# Patient Record
Sex: MALE | Race: WHITE
[De-identification: names, ages, dates, MRNs, and addresses within clinical notes are randomized per-mention and may not be internally consistent; named-entity substitution may affect disease eponyms.]

---

## 2017-01-20 ENCOUNTER — HOSPITAL ENCOUNTER (EMERGENCY)
Dept: HOSPITAL 80 - FED | Age: 82
Discharge: HOME | End: 2017-01-20
Payer: COMMERCIAL

## 2017-01-20 VITALS
RESPIRATION RATE: 16 BRPM | TEMPERATURE: 97.9 F | DIASTOLIC BLOOD PRESSURE: 74 MMHG | HEART RATE: 64 BPM | OXYGEN SATURATION: 96 % | SYSTOLIC BLOOD PRESSURE: 144 MMHG

## 2017-01-20 DIAGNOSIS — R04.0: Primary | ICD-10-CM

## 2017-01-20 DIAGNOSIS — Z87.891: ICD-10-CM

## 2017-01-20 DIAGNOSIS — I10: ICD-10-CM

## 2017-01-20 NOTE — EDPHY
H & P


Stated Complaint: Left nostril bleeding x 45 min


Time Seen by Provider: 01/20/17 01:12


HPI/ROS: 


HPI


The patient presents with epistaxis from left nose which has been present for 

the last 1 hour, now resolved.  The patient recently returned from [place].  He noticed the bleeding tonight while at rest.  He tried packing his 

nose, however passed 2 large clots and came into the emergency room.  Now with 

pressure the bleeding has stopped.  He has been evaluated by ENT in the past 

and had cauterization performed.  He is on clopidogrel..





REVIEW OF SYSTEMS


Constitutional:  No fever, no chills.


Eyes:  No discharge.


ENT:  No sore throat.


Skin:  No rashes.


Neurological:  No headache.











PHYSICAL


General Appearance: Alert, no distress


Eyes: Pupils equal and round no pallor or injection


ENT, Mouth: Nares with no active bleeding, Mucous membranes moist


Respiratory:  Breathing comfortably


Neurological:  A&O, moves all extremities


Skin:  Warm and dry, no rashes


Musculoskeletal: Neck is supple non tender 


Extremities:  symmetrical, full range of motion 


Psychiatric:  Patient is oriented X 3, there is no agitation 





Source: Patient





- Personal History


Current Tetanus/Diphtheria Vaccine: Yes


Current Tetanus Diphtheria and Acellular Pertussis (TDAP): Yes


Tetanus Vaccine Date: 2013





- Medical/Surgical History


Hx Asthma: No


Hx Chronic Respiratory Disease: No


Hx Diabetes: No


Hx Cardiac Disease: Yes


Hx Renal Disease: No


Hx Cirrhosis: No


Hx Alcoholism: No


Hx HIV/AIDS: No


Hx Splenectomy or Spleen Trauma: No


Other PMH: BYPASS X 5, HIGH CHOLESTEROL, HTN, USE CPAP AT NIGHT, Total right 

knee, R shoulder muscle surgery





- Social History


Smoking Status: Former smoker


Constitutional: 


 Initial Vital Signs











Heart Rate  86   01/20/17 00:53


 


Respiratory Rate  18   01/20/17 00:53


 


Blood Pressure  155/110 H  01/20/17 00:53


 


O2 Sat (%)  93   01/20/17 00:53








 











O2 Delivery Mode               Room Air














Allergies/Adverse Reactions: 


 





No Known Allergies Allergy (Verified 01/20/17 00:48)


 








Home Medications: 














 Medication  Instructions  Recorded


 


FOSINOPRIL SODIUM  07/30/14


 


Finasteride [Proscar 5 MG (*)] 5 mg PO DAILY 07/30/14


 


Hydrochlorothiazide [HCTZ (*)] 12.5 mg PO DAILY 07/30/14


 


Metoprolol Succinate Xr [Toprol Xl 100 mg PO DAILY@1630 07/30/14





100 mg (*)]  


 


Rosuvastatin Calcium [Crestor] 10 mg PO DAILY@1630 07/30/14


 


Travoprost Z 0.004% [Travatan Z 1 drops EACHEYE HS 07/30/14





0.004% (*)]  


 


Vit C/Priti AC/Lut/Copper/Znox 1 each PO DAILY@1630 07/30/14





[Preservision Lutein Softgel]  


 


Clopidogrel  01/20/17














Medical Decision Making


Differential Diagnosis: 


This is an 88-year-old man on the clopidogrel who presents from home with 

epistaxis, now resolved.  On exam, he is hypertensive, no obvious source of 

bleeding is identified.  Differential diagnosis includes bleeding from 

Kiesselbach's plexus, posterior epistaxis, coagulopathy related to clopidogrel.





In the emergency room, the patient was given Afrin and nasal clamps for home 

use.  He was given information for ENT for follow-up tomorrow as needed.  He 

had no recurrence of his bleeding.





- Data Points


Medications Given: 


 








Discontinued Medications





Oxymetazoline HCl (Afrin Nasal Spray)  2 sprays EACHNARE EDNOW ONE


   Stop: 01/20/17 01:49


   Last Admin: 01/20/17 01:51 Dose:  2 spray








Departure





- Departure


Disposition: Home, Routine, Self-Care


Clinical Impression: 


 Acute anterior epistaxis


Condition: Good


Instructions:  Nosebleed (ED)


Additional Instructions: 


Please call the ENT as listed below for follow-up later today.  He should 

return to the emergency room if your worse in any way.


Referrals: 


Heriberto Sow MD [Primary Care Provider] - As per Instructions


Ashley Castaneda PA [Physician Assistant] - As per Instructions

## 2018-02-20 ENCOUNTER — HOSPITAL ENCOUNTER (EMERGENCY)
Dept: HOSPITAL 80 - FED | Age: 83
Discharge: HOME | End: 2018-02-20
Payer: COMMERCIAL

## 2018-02-20 VITALS — RESPIRATION RATE: 18 BRPM | TEMPERATURE: 97.7 F

## 2018-02-20 VITALS — OXYGEN SATURATION: 95 % | HEART RATE: 89 BPM | DIASTOLIC BLOOD PRESSURE: 87 MMHG | SYSTOLIC BLOOD PRESSURE: 128 MMHG

## 2018-02-20 DIAGNOSIS — R04.0: Primary | ICD-10-CM

## 2018-02-20 DIAGNOSIS — Z87.891: ICD-10-CM

## 2018-02-20 DIAGNOSIS — I10: ICD-10-CM

## 2018-02-20 PROCEDURE — 3E09XTZ INTRODUCTION OF DESTRUCTIVE AGENT INTO NOSE, EXTERNAL APPROACH: ICD-10-PCS | Performed by: EMERGENCY MEDICINE

## 2018-02-20 NOTE — EDPHY
H & P


Stated Complaint: nosebleed x45 min


Time Seen by Provider: 02/20/18 00:14


HPI/ROS: 





Chief Complaint:  Nosebleed





HPI:  89-year-old male woke 45 min ago with bleeding from his left nostril.  He 

has a history of nosebleeds in the past.  Had a mild when a couple days ago 

that went away with direct pressure for few minutes.  He is on Plavix but no 

other blood thinning medications.  Has required cautery 2 years ago.  No recent 

illness.  No fevers or chills.  They do not have a humidifier in the home 

because they have had mold issues in the past.  No headache.  No foreign bodies.





ROS:  10 point Review of Systems is negative except as noted in the HPI.





Family History: non-contributory





Physical Exam:


Gen: Awake, Alert, No Distress


HEENT:  


     Nose:  Moderate bleeding from his anterior left nostril


     Eyes: PERRLA, EOMI


     Mouth: Moist mucosa 


Neck: Supple, no JVD


Ext: no edema, non-tender


Skin: no rash


Neuro: CN II-XII intact, Sensation grossly intact, Strength 5/5 in bilateral 

upper and lower extremities








- Personal History


Current Tetanus/Diphtheria Vaccine: Yes


Tetanus Vaccine Date: 2013





- Medical/Surgical History


Hx Asthma: No


Hx Chronic Respiratory Disease: No


Hx Diabetes: No


Hx Cardiac Disease: Yes


Hx Renal Disease: No


Hx Cirrhosis: No


Hx Alcoholism: No


Hx HIV/AIDS: No


Hx Splenectomy or Spleen Trauma: No


Other PMH: BYPASS X 5, HIGH CHOLESTEROL, HTN, USE CPAP AT NIGHT, Total right 

knee, R shoulder muscle surgery





- Social History


Smoking Status: Former smoker


Constitutional: 


 Initial Vital Signs











Temperature (C)  36.5 C   02/20/18 00:03


 


Heart Rate  118 H  02/20/18 00:03


 


Respiratory Rate  18   02/20/18 00:03


 


Blood Pressure  159/105 H  02/20/18 00:03


 


O2 Sat (%)  96   02/20/18 00:03








 











O2 Delivery Mode               Room Air














Allergies/Adverse Reactions: 


 





No Known Allergies Allergy (Verified 01/20/17 00:48)


 








Home Medications: 














 Medication  Instructions  Recorded


 


FOSINOPRIL SODIUM  07/30/14


 


Finasteride [Proscar 5 MG (*)] 5 mg PO DAILY 07/30/14


 


Hydrochlorothiazide [HCTZ (*)] 12.5 mg PO DAILY 07/30/14


 


Metoprolol Succinate Xr [Toprol Xl 100 mg PO DAILY@1630 07/30/14





100 mg (*)]  


 


Rosuvastatin Calcium [Crestor] 10 mg PO DAILY@1630 07/30/14


 


Travoprost Z 0.004% [Travatan Z 1 drops EACHEYE HS 07/30/14





0.004% (*)]  


 


Vit C/Vit E AC/Lut/Copper/Zinc 1 each PO DAILY@1630 07/30/14





[Preservision Lutein Softgel]  


 


Clopidogrel  01/20/17














Medical Decision Making


Procedures: 





Procedure:  Epistaxis control.


After verbal consent was obtained, the patient was anesthetized with 4% 

lidocaine and Car-Synephrine.  The anterior epistaxis was identified.  The 

patient was treated with silver nitrate cautery.  Following the procedure the 

patient was re-examined and the bleeding was well controlled.  The patient 

tolerated the procedure well.  The procedure was performed by myself.





- Data Points


Medications Given: 


 








Discontinued Medications





Lidocaine HCl (Lidocaine Hcl 4% Topical Solution)  2 ml MM EDNOW ONE


   Stop: 02/20/18 00:20


   Last Admin: 02/20/18 00:21 Dose:  2 ml


Oxymetazoline HCl (Afrin Nasal Spray)  2 sprays EACHNARE EDNOW ONE


   Stop: 02/20/18 00:21


   Last Admin: 02/20/18 00:22 Dose:  2 spray


Silver Nitrate/Potassium Nitrate (Silver Nitrate Applicator)  3 each TP EDNOW 

ONE


   Stop: 02/20/18 00:19


   Last Admin: 02/20/18 00:22 Dose:  3 each








Departure





- Departure


Disposition: Home, Routine, Self-Care


Clinical Impression: 


 Acute anterior epistaxis





Condition: Good


Instructions:  Nosebleed (ED)


Additional Instructions: 


Follow up with Ear Nose and Throat doctor in 2-3 days for further evaluation.


Return to the emergency department for return of bleeding, lightheadedness, 

fainting, nausea vomiting, or any other concerns.


Referrals: 


Heriberto Sow MD [Primary Care Provider] - As per Instructions


David Macias MD [Medical Doctor] - As per Instructions

## 2018-05-30 ENCOUNTER — HOSPITAL ENCOUNTER (OUTPATIENT)
Dept: HOSPITAL 80 - FIMAGING | Age: 83
Discharge: HOME | End: 2018-05-30
Attending: INTERNAL MEDICINE
Payer: COMMERCIAL

## 2018-05-30 DIAGNOSIS — Z79.01: ICD-10-CM

## 2018-05-30 DIAGNOSIS — E66.9: ICD-10-CM

## 2018-05-30 DIAGNOSIS — Z87.891: ICD-10-CM

## 2018-05-30 DIAGNOSIS — I25.10: ICD-10-CM

## 2018-05-30 DIAGNOSIS — N40.0: ICD-10-CM

## 2018-05-30 DIAGNOSIS — I10: ICD-10-CM

## 2018-05-30 DIAGNOSIS — Z95.1: ICD-10-CM

## 2018-05-30 DIAGNOSIS — I48.92: Primary | ICD-10-CM

## 2018-05-30 DIAGNOSIS — E78.00: ICD-10-CM

## 2018-05-30 LAB
INR PPP: 1.55 (ref 0.83–1.16)
PROTHROMBIN TIME: 18.7 SEC (ref 12–15)

## 2018-05-30 PROCEDURE — 5A2204Z RESTORATION OF CARDIAC RHYTHM, SINGLE: ICD-10-PCS | Performed by: INTERNAL MEDICINE

## 2018-05-30 PROCEDURE — B245ZZ4 ULTRASONOGRAPHY OF LEFT HEART, TRANSESOPHAGEAL: ICD-10-PCS | Performed by: INTERNAL MEDICINE

## 2018-05-30 NOTE — CPEKG
Heart Rate: 100

RR Interval: 600

QRSD Interval: 122

QT Interval: 384

QTC Interval: 496

QRS Axis: 72

T Wave Axis: -32

EKG Severity - ABNORMAL ECG -

EKG Impression: ATRIAL FIBRILLATION, V-RATE 

EKG Impression: RIGHT BUNDLE BRANCH BLOCK

Electronically Signed By: Xander Sow 30-May-2018 11:45:07

## 2018-05-30 NOTE — PDTEE1
CIARAN Cardioversion Procedure


Procedure: electrical cardioversion, transesophageal echo


Indications: other (atrial flutter)


Consent: signed and in chart


Anticoagulation: warfarin


Procedural Details: 


Pads were placed in anterior-posterior position.  CIARAN probe was advanced and 

standard images obtained.  There is no evidence of left atrial or left atrial 

appendage thrombus.





Synchronized cardioversion attempt #1: 200J


Results: normal sinus rhythm


Conclusions: successful CIARAN cardioversion


Patient Problems: 


 Problems











Problem Status Onset


 


TIA (transient ischemic attack) Acute

## 2018-05-30 NOTE — ECHO
[email or web address]





[place] [id number] 

Main: [phone] 



Fax: 



Transesophageal Echocardiography 

Name:            GERARDO JEFFERY                         MR#:

[id number]

Study Date:      05/30/2018                           Study Time:

10:16 AM

YOB: 1928                           Age:

89 year(s)

Height:            ( )                                Weight:

( )

BSA:                                                  Gender:

Male

Examination:     CIARAN                                  Indication:

Pre Cardioversion

Image Quality:                                        Contrast: 

Requested by:     Regan Santamaria  

Heart Rate:                                           Rhythm:

Atrial fibrillation

BP:                / 



Procedure Staff 

Ultrasound Technician:   Matthew Monroy RDCS 

Reading Physician:       Regan Santamaria MD 

Requesting Provider: 



CIARAN Exam Details 



Conclusions:          Normal global systolic LV function.  

An agitated saline study was performed and was negative for

intracardiac shunting.

Spontaneous contrast in the left atrium.  

No thrombus in left appendage.  

Trivial mitral valve regurgitation.  

Mild aortic cusp calcification is noted.  

Mild aortic valve regurgitation is present.  

Proceeded with successful elective DC cardioversion.. 







Measurements: 

Chambers                   Valvular Assessment AV/MV          Valvular

Assessment TV/PV



Normal                                Normal

Normal

Name         Value   Range             Name        Value Range

Name          Value Range



Additional Measurements: 



Findings:             Left Ventricle: 

Normal global systolic LV function.  

Left Atrium: 

An agitated saline study was performed and was negative for

intracardiac shunting. Spontaneous

contrast in the left atrium.  

Left Atrial Appendage: 



Patient: GERARDO JEFFERY                       MRN: [id number]

Study Date: 05/30/2018   Page 1 of 2

10:16 AM 









Good color flow doppler in the left atrial appendage. No thrombus in

left appendage.

Right Atrium: 

The right atrium is normal in size.  

Mitral Valve: 

Trivial mitral valve regurgitation.  

Aortic Valve: 

Mild aortic cusp calcification is noted. Mild aortic valve

regurgitation is present.

Pulmonic Valve: 

The pulmonic valve is normal in appearance and function.  

Aorta: 

The aorta is normal.  

Pericardium: 

No pericardial effusion.  

Exam Comments: 

Proceeded with successful elective DC cardioversion.. 



l1n 



Electronically signed by Regan Santamaria MD on 05/30/2018 at 04:55 PM 

(No Signature Object) 



Patient: GERARDO JEFFERY                       MRN: [id number]

Study Date: 05/30/2018   Page 2 of 2

10:16 AM 







[id number]

## 2018-05-30 NOTE — POSTANESTH
Post Anesthetic Evaluation


Cardiovascular Status: Normal, Stable


Respiratory Status: Normal, Stable


Level of Consciousness/Mental Status: Can Participate in Eval, Moderately Sleepy


Pain Control: Adequate, Prn Tx Ordered


Nausea/Vomiting Control: Adequate, Prn Tx Ordered


Complications Possibly Related to Anesthesia: None Noted

## 2018-05-30 NOTE — PDANEPAE
ANE History of Present Illness





a flutter





ANE Past Medical History





- Cardiovascular History


Hx Hypertension: Yes


Hx Arrhythmias: Yes


Hx Chest Pain: No


Hx Coronary Artery / Peripheral Vascular Disease: Yes


Hx CHF / Valvular Disease: No


Hx Palpitations: No





- Pulmonary History


Hx COPD: No


Hx Asthma/Reactive Airway Disease: No


Hx Recent Upper Respiratory Infection: No


Hx Oxygen in Use at Home: No


Hx Sleep Apnea: Yes





- Endocrine History


Hx Diabetes: No


Obesity: yes





- Chronic Pain History


Chronic Pain: No





ANE Review of Systems


Review of systems is: negative


Review of Systems: 








- Exercise capacity


Exercise capacity: >=4 METS





ANE Patient History





- Allergies


Allergies/Adverse Reactions: 








No Known Allergies Allergy (Verified 01/20/17 00:48)


 








- Home Medications


Home medications: home medication list seen and reviewed


Home Medications: 








FOSINOPRIL SODIUM  07/30/14 [Last Taken Unknown]


Finasteride [Proscar 5 MG (*)] 5 mg PO DAILY 07/30/14 [Last Taken 07/29/14]


Hydrochlorothiazide [HCTZ (*)] 12.5 mg PO DAILY 07/30/14 [Last Taken 05/29/18 13

:00]


Metoprolol Succinate Xr [Toprol Xl 100 mg (*)] 50 mg PO DAILY 07/30/14 [Last 

Taken 05/30/18 07:00]


Rosuvastatin Calcium [Crestor] 10 mg PO DAILY@1630 07/30/14 [Last Taken 07/29/14

]


Travoprost Z 0.004% [Travatan Z 0.004% (*)] 1 drops EACHEYE HS 07/30/14 [Last 

Taken 07/29/14]


Vit C/Vit E AC/Lut/Copper/Zinc [Preservision Lutein Softgel] 1 each PO DAILY@

1630 07/30/14 [Last Taken 07/29/14]


Clopidogrel 75 mg PO DAILY 01/20/17 [Last Taken 05/30/18 07:00]


Warfarin Sodium 1 mg PO DAILY 05/30/18 [Last Taken 05/29/18 13:00]








- Smoking Hx


Smoking Status: Former smoker





ANE Labs/Vital Signs





- Labs


Result Diagrams: 


 05/30/18 09:20





- Vital Signs


Height: 168 cm


Weight: 81.6 kg





ANE Physical Exam





- Airway


Neck exam: FROM


Mallampati Score: Class 2


Mouth exam: poor dentition





- Pulmonary


Pulmonary: no respiratory distress





- Cardiovascular


Cardiovascular: regular rate and rhythym





- ASA Status


ASA Status: III





ANE Anesthesia Plan


Anesthesia Plan: GA with mask


Urgent/Emergent Case: Anes eval completed preop but documented later for safe 

timely pt care

## 2018-05-31 NOTE — CPEKG
Heart Rate: 43

RR Interval: 1395

QRSD Interval: 128

QT Interval: 492

QTC Interval: 417

QRS Axis: 44

T Wave Axis: -90

EKG Severity - ABNORMAL ECG -

EKG Impression: ATRIAL FIBRILLATION

EKG Impression: RIGHT BUNDLE BRANCH BLOCK

Electronically Signed By: Xander Sow 31-May-2018 08:41:12